# Patient Record
Sex: MALE | Race: BLACK OR AFRICAN AMERICAN | NOT HISPANIC OR LATINO | URBAN - METROPOLITAN AREA
[De-identification: names, ages, dates, MRNs, and addresses within clinical notes are randomized per-mention and may not be internally consistent; named-entity substitution may affect disease eponyms.]

---

## 2019-09-06 ENCOUNTER — EMERGENCY (EMERGENCY)
Facility: HOSPITAL | Age: 29
LOS: 0 days | Discharge: HOME | End: 2019-09-06
Attending: EMERGENCY MEDICINE
Payer: OTHER MISCELLANEOUS

## 2019-09-06 VITALS
HEART RATE: 70 BPM | TEMPERATURE: 98 F | RESPIRATION RATE: 17 BRPM | OXYGEN SATURATION: 99 % | DIASTOLIC BLOOD PRESSURE: 71 MMHG | SYSTOLIC BLOOD PRESSURE: 147 MMHG | HEIGHT: 68 IN | WEIGHT: 187.83 LBS

## 2019-09-06 DIAGNOSIS — H57.11 OCULAR PAIN, RIGHT EYE: ICD-10-CM
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
## 2019-09-06 DIAGNOSIS — S05.8X1A OTHER INJURIES OF RIGHT EYE AND ORBIT, INITIAL ENCOUNTER: ICD-10-CM
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
## 2019-09-06 PROCEDURE — 99282 EMERGENCY DEPT VISIT SF MDM: CPT

## 2019-09-06 NOTE — ED PROVIDER NOTE - PATIENT PORTAL LINK FT
You can access the FollowMyHealth Patient Portal offered by [facility] by registering at the following website: [email or web address]. By joining [email or web address]’s FollowMyHealth portal, you will also be able to view your health information using other applications (apps) compatible with our system.

## 2019-09-06 NOTE — ED PROVIDER NOTE - OBJECTIVE STATEMENT
28 year old male with no significant PMH presenting to ED due to eye pain on R after R face being hit by blunt surface of duct while he was working. Pt denies any foreign body sensation and no vision changes - R eye tearing otherwise.

## 2019-09-06 NOTE — ED ADULT NURSE NOTE - OBJECTIVE STATEMENT
Pt walked in c/o pain to the Rt eye got hit by  a metal box at work, yesterday @130pm. Pt complaining of swelling and redness to eye as well as blurred vision.

## 2019-09-06 NOTE — ED PROVIDER NOTE - CLINICAL SUMMARY MEDICAL DECISION MAKING FREE TEXT BOX
R eye blunt trauma, no significant findings on exam -other than tearing - will dc with Optho follow up if not improved.

## 2019-11-22 NOTE — ED ADULT NURSE NOTE - COMFORT/ACCEPTABLE PAIN LEVEL (0-10)
I have personally performed a face to face diagnostic evaluation on this patient. I have reviewed the ACP note and agree with the history, exam and plan of care, except as noted.
2

## 2021-05-27 NOTE — ED ADULT NURSE NOTE - VISUAL DISTURBANCES
We have received notification this patient is restarting his dupixent. We have been trying to reach the patient to set up shipment of the medication but we have not been able to reach the patient after 4 attempts.     We are unable to send the medication without speaking to the patient. If the patient calls your office, please direct them to [facility] at [phone] to set up shipment.     We will attempt to reach the patient 2 more times over the next 2 weeks.     Thank you,  Tessa Trimble, PharmD.  [facility] Coordinator  [id number] [place] [address]  T: [phone] F: [phone]  [email or web address]    
yes

## 2024-03-22 ENCOUNTER — EMERGENCY (EMERGENCY)
Facility: HOSPITAL | Age: 34
LOS: 0 days | Discharge: HOME | End: 2024-03-22
Payer: MEDICAID

## 2024-03-22 VITALS
RESPIRATION RATE: 19 BRPM | DIASTOLIC BLOOD PRESSURE: 69 MMHG | WEIGHT: 210.1 LBS | SYSTOLIC BLOOD PRESSURE: 116 MMHG | TEMPERATURE: 98 F | HEIGHT: 67 IN | OXYGEN SATURATION: 98 % | HEART RATE: 56 BPM

## 2024-03-22 VITALS
RESPIRATION RATE: 18 BRPM | OXYGEN SATURATION: 98 % | DIASTOLIC BLOOD PRESSURE: 89 MMHG | HEART RATE: 62 BPM | SYSTOLIC BLOOD PRESSURE: 121 MMHG

## 2024-03-22 DIAGNOSIS — S01.512A LACERATION WITHOUT FOREIGN BODY OF ORAL CAVITY, INITIAL ENCOUNTER: ICD-10-CM
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
## 2024-03-22 PROBLEM — Z78.9 OTHER SPECIFIED HEALTH STATUS: Chronic | Status: ACTIVE | Noted: 2019-09-06

## 2024-03-22 PROCEDURE — 99283 EMERGENCY DEPT VISIT LOW MDM: CPT

## 2024-03-22 NOTE — ED ADULT NURSE NOTE - NSFALLUNIVINTERV_ED_ALL_ED
Bed/Stretcher in lowest position, wheels locked, appropriate side rails in place/Call bell, personal items and telephone in reach/Instruct patient to call for assistance before getting out of bed/chair/stretcher/Non-slip footwear applied when patient is off stretcher/[place] to call system/Physically safe environment - no spills, clutter or unnecessary equipment/Purposeful proactive rounding/Room/bathroom lighting operational, light cord in reach

## 2024-03-22 NOTE — ED PROVIDER NOTE - CLINICAL SUMMARY MEDICAL DECISION MAKING FREE TEXT BOX
33M w/ no reported PMH who presents to ED w/ tongue laceration x 1 hour ago. Pt states that his chin made direct contact w/ a plank of wood, thus causing him to accidentally bite his own tongue, pt w/ laceration to tip of the tongue, pt gargled w/ salt water and states that the tongue discomfort improved significantly. Denies fever, chills, chest pain, shortness of breath, abdominal pain, N/V/D, dysuria, urinary frequency/urgency, extremity weakness/numbness/tingling, lightheadedness, dizziness, or headaches.    Patient currently afebrile, hemodynamically stable, spO2 98%. On PE - pt well-appearing, in no acute distress, heart w/ RRR, chest symmetrical, non-labored breathing, lungs clear bilaterally, + 0.75 cm superficial linear laceration to tip of the tongue, no active bleeding, no through-and through. Shared Decision Making - Discussed w/ pt that tongue lacerations heal well on their own and typically to not require any repair, but possible to place a suture, pt decided not to have laceration repair performed at this time, counseled on eating liquids/soft foods until tongue laceration begins to heal. Patient is medically stable for discharge. Strict return precautions given, discussed red flag signs/symptoms. Patient to follow up with PMD. Patient/parent displays understanding and agreeable with plan, comfortable with discharge plan home. Plan for discharge discussed with Dr. Washington who agrees with disposition and discharge plan.

## 2024-03-22 NOTE — ED PROVIDER NOTE - MOUTH
+ 0.75 cm superficial linear laceration to tip of the tongue, no active bleeding, no through-and through./no ulcers

## 2024-03-22 NOTE — ED ADULT NURSE NOTE - OBJECTIVE STATEMENT
Patient c/o small tongue laceration after biting tongue earlier today. No active bleeding. Patient reports rising mouth with salt water.

## 2024-03-22 NOTE — ED PROVIDER NOTE - NSTIMEPROVIDERCAREINITIATE_GEN_ER
"PHYSICIAN NEXT STEPS:  Call the Patient    CHIEF COMPLAINT:  Chief Complaint/Protocol Used: Breathing Difficulty  Onset: 2 days      ASSESSMENT:  ? Onset: 2 days  ? Normal True  ? Respiratory Status: Shortness of breath, neb tx q 4 hours, s  ? Onset: 2 days  ? PATTERN ""Does the difficult breathing come and go, or has it been constant since it started? \"" Intermittent  ? Severity: Mild-moderate  ? Recurrent Symptom: Yes  ? Lung History: COPD  ? Other Symptoms: Productive Cough;white post neb treatment (requesting portable nebulizer)  -------------------------------------------------------    DISPOSITION:  Disposition Recommendation: See PCP within 2 Weeks  Questions that led to disposition:  ? [1] MILD longstanding difficulty breathing AND [2] SAME as normal  Patient Directed To: Unspecified  Patient Intended Action: Talk with my doctor      CALL NOTES:  03/23/2020 at 10:58 AM by Gwendolyn Palma  ? Patient requests portable nebulizer, sent to [facility]. Requests to return back to work, may be reahed [phone]. DISPOSITION OVERRIDE/PROVIDER CONSULT:  Disposition Override: N/A  Override Source: Unspecified  Consulted with PCP: No  Consulted with On-Call Physician: No    CALLER CONTACT INFO:  Name: Demetria Laurent (Self)  Phone 1: [phone] (Home Phone)  Phone 2: [phone] (Mobile) - Preferred  Phone 3: [phone] (Work Phone)      ENCOUNTER STARTED:  03/23/20 10:51:19 AM  ENCOUNTER ASSIGNED TO/CLOSED BY:  Gwendolyn Palma @ 03/23/20 10:58:28 AM      -------------------------------------------------------    CARE ADVICE given per Breathing Difficulty guideline. SEE PCP WITHIN 2 WEEKS:   * You need an evaluation for this ongoing problem within the next 2 weeks. Call your doctor during regular office hours and make an appointment. * IF PATIENT HAS NO PCP: An urgent care center is often the best source of care if you do not have a regular doctor you can see in the next two weeks.  NOTE: Try to help " caller find a doctor. Is there a physician referral line or other resource?  Having a   PCP or 'medical home' means better long-term care.; CALL BACK IF:   * Severe difficulty breathing occurs   * Fever more than 100.5 F (38.1 C)    * You become worse      UNDERSTANDS CARE ADVICE: Yes    AGREES WITH CARE ADVICE: Yes    WILL FOLLOW CARE ADVICE: Yes    ------------------------------------------------------- 22-Mar-2024 14:28

## 2024-03-22 NOTE — ED PROVIDER NOTE - PATIENT PORTAL LINK FT
You can access the FollowMyHealth Patient Portal offered by [facility] by registering at the following website: [email or web address]. By joining [employer]’s FollowMyHealth portal, you will also be able to view your health information using other applications (apps) compatible with our system.

## 2024-03-22 NOTE — ED ADULT TRIAGE NOTE - CHIEF COMPLAINT QUOTE
Patient presents to ED c/o pain to tongue after accident biting into tongue. Patient hit his chin and bite tongue. Used salt water to ease discomfort.   no pmh

## 2025-06-26 ENCOUNTER — EMERGENCY (EMERGENCY)
Facility: HOSPITAL | Age: 35
LOS: 0 days | Discharge: HOME | End: 2025-06-26
Attending: STUDENT IN AN ORGANIZED HEALTH CARE EDUCATION/TRAINING PROGRAM
Payer: COMMERCIAL

## 2025-06-26 VITALS — OXYGEN SATURATION: 99 % | HEART RATE: 57 BPM

## 2025-06-26 VITALS
DIASTOLIC BLOOD PRESSURE: 78 MMHG | HEART RATE: 67 BPM | RESPIRATION RATE: 16 BRPM | SYSTOLIC BLOOD PRESSURE: 126 MMHG | OXYGEN SATURATION: 98 %

## 2025-06-26 PROCEDURE — 99284 EMERGENCY DEPT VISIT MOD MDM: CPT

## 2025-06-26 PROCEDURE — 73130 X-RAY EXAM OF HAND: CPT | Mod: 26,LT

## 2025-06-26 PROCEDURE — 72040 X-RAY EXAM NECK SPINE 2-3 VW: CPT | Mod: 26

## 2025-06-26 RX ORDER — ACETAMINOPHEN 500 MG/5ML
650 LIQUID (ML) ORAL ONCE
Refills: 0 | Status: COMPLETED | OUTPATIENT
Start: 2025-06-26 | End: 2025-06-26

## 2025-06-26 RX ORDER — METHOCARBAMOL 500 MG/1
2 TABLET, FILM COATED ORAL
Qty: 18 | Refills: 0
Start: 2025-06-26 | End: 2025-06-28

## 2025-06-26 RX ADMIN — Medication 650 MILLIGRAM(S): at 20:53

## 2025-06-26 NOTE — ED PROVIDER NOTE - SECONDARY DIAGNOSIS.
Initiate Treatment: Serica scar cream twice a day Detail Level: Zone Render In Strict Bullet Format?: No Neck pain
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
## 2025-06-26 NOTE — ED PROVIDER NOTE - NSFOLLOWUPINSTRUCTIONS_ED_ALL_ED_FT
Follow-up with your Primary Care Physician within the next week.  Follow-up with Orthopedics as discussed.    Medications  - Take Tylenol (Acetaminophen) 650 mg every 4-6 hours AND/OR Motrin (Ibuprofen) 600 mg every 6-8 hours as needed for pain/fever.  - Robaxin 500 mg, 2 tablets, every 8 hours, as needed for muscle spasm/pain. CAUTION: May cause drowsiness - DO NOT drink alcohol, drive, or operate heavy machinery while using this medication.    Advance activity as tolerated.  Continue all previously prescribed medications as directed unless otherwise instructed.  Follow up with your primary care physician in 48-72 hours- bring copies of your results.  Return to the ER for worsening or persistent symptoms, and/or ANY NEW OR CONCERNING SYMPTOMS such as fever, chest pain, shortness of breath, abdominal pain, or headaches. If you have issues obtaining follow up, please call: [phone] to obtain a doctor or specialist who takes your insurance in your area.  You may call [phone] to make an appointment with the internal medicine clinic.    Musculoskeletal pain refers to aches and pains in your bones, joints, muscles, and the tissues that surround them. This pain can occur in any part of the body. It can last for a short time (acute) or a long time (chronic).    A physical exam, lab tests, and imaging studies may be done to find the cause of your musculoskeletal pain.    Follow these instructions at home:     Lifestyle    Try to control or lower your stress levels. Stress increases muscle tension and can worsen musculoskeletal pain. It is important to recognize when you are anxious or stressed and learn ways to manage it. This may include:    Meditation or yoga.  Cognitive or behavioral therapy.  Acupuncture or massage therapy.  You may continue all activities unless the activities cause more pain. When the pain gets better, slowly resume your normal activities. Gradually increase the intensity and duration of your activities or exercise.    Managing pain, stiffness, and swelling    Take over-the-counter and prescription medicines only as told by your health care provider.  When your pain is severe, bed rest may be helpful. Lie or sit in any position that is comfortable, but get out of bed and walk around at least every couple of hours.  If directed, apply heat to the affected area as often as told by your health care provider. Use the heat source that your health care provider recommends, such as a moist heat pack or a heating pad.    Place a towel between your skin and the heat source.  Leave the heat on for 20–30 minutes.  Remove the heat if your skin turns bright red. This is especially important if you are unable to feel pain, heat, or cold. You may have a greater risk of getting burned.  If directed, put ice on the painful area.    Put ice in a plastic bag.  Place a towel between your skin and the bag.  Leave the ice on for 20 minutes, 2–3 times a day.        General instructions    Your health care provider may recommend that you see a physical therapist. This person can help you come up with a safe exercise program. Do any exercises as told by your physical therapist.  Keep all follow-up visits, including any physical therapy visits, as told by your health care providers. This is important.    Contact a health care provider if:  Your pain gets worse.  Medicines do not help ease your pain.  You cannot use the part of your body that hurts, such as your arm, leg, or neck.  You have trouble sleeping.  You have trouble doing your normal activities.    Get help right away if:  You have a new injury and your pain is worse or different.  You feel numb or you have tingling in the painful area.    Summary  Musculoskeletal pain refers to aches and pains in your bones, joints, muscles, and the tissues that surround them.  This pain can occur in any part of the body.  Your health care provider may recommend that you see a physical therapist. This person can help you come up with a safe exercise program. Do any exercises as told by your physical therapist.  Lower your stress level. Stress can worsen musculoskeletal pain. Ways to lower stress may include meditation, yoga, cognitive or behavioral therapy, acupuncture, and massage therapy.    ADDITIONAL NOTES AND INSTRUCTIONS    Please follow up with your Primary MD in 24-48 hr.  Seek immediate medical care for any new/worsening signs or symptoms.
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
## 2025-06-26 NOTE — ED ADULT TRIAGE NOTE - ESI TRIAGE ACUITY LEVEL, MLM
Treatment Plan Progress Note/Staffing    Discussed patient's progress and plan of care.     The following was discussed: Patient compliant with treatment plan, attending groups, med compliant. Patient refers to self as \"we\" with \"alters\" and refers to self as a \"system.\"     Providers attended: Dr. Hall, NP Jammie & SILAS Macdonald     Time of meeting: Morning of 2/24/22    Expected discharge: TBD   4

## 2025-06-26 NOTE — ED PROVIDER NOTE - PATIENT PORTAL LINK FT
You can access the FollowMyHealth Patient Portal offered by [facility] by registering at the following website: [email or web address]. By joining [employer]’s FollowMyHealth portal, you will also be able to view your health information using other applications (apps) compatible with our system.